# Patient Record
Sex: FEMALE | Race: WHITE | URBAN - METROPOLITAN AREA
[De-identification: names, ages, dates, MRNs, and addresses within clinical notes are randomized per-mention and may not be internally consistent; named-entity substitution may affect disease eponyms.]

---

## 2019-11-01 ENCOUNTER — OUTPATIENT (OUTPATIENT)
Dept: OUTPATIENT SERVICES | Facility: HOSPITAL | Age: 64
LOS: 1 days | Discharge: HOME | End: 2019-11-01
Payer: COMMERCIAL

## 2019-11-12 PROCEDURE — 90791 PSYCH DIAGNOSTIC EVALUATION: CPT

## 2019-11-20 ENCOUNTER — APPOINTMENT (OUTPATIENT)
Dept: SPINE | Facility: CLINIC | Age: 64
End: 2019-11-20

## 2019-12-05 ENCOUNTER — APPOINTMENT (OUTPATIENT)
Dept: SPINE | Facility: CLINIC | Age: 64
End: 2019-12-05

## 2020-12-24 ENCOUNTER — APPOINTMENT (OUTPATIENT)
Dept: ENDOCRINOLOGY | Facility: CLINIC | Age: 65
End: 2020-12-24

## 2022-01-09 ENCOUNTER — APPOINTMENT (OUTPATIENT)
Dept: PULMONOLOGY | Facility: CLINIC | Age: 67
End: 2022-01-09
Payer: MEDICARE

## 2022-01-09 ENCOUNTER — APPOINTMENT (OUTPATIENT)
Dept: PULMONOLOGY | Facility: CLINIC | Age: 67
End: 2022-01-09

## 2022-01-09 DIAGNOSIS — D32.9 BENIGN NEOPLASM OF MENINGES, UNSPECIFIED: ICD-10-CM

## 2022-01-09 DIAGNOSIS — R05.9 COUGH, UNSPECIFIED: ICD-10-CM

## 2022-01-09 DIAGNOSIS — D35.2 BENIGN NEOPLASM OF PITUITARY GLAND: ICD-10-CM

## 2022-01-09 DIAGNOSIS — I10 ESSENTIAL (PRIMARY) HYPERTENSION: ICD-10-CM

## 2022-01-09 DIAGNOSIS — U07.1 COVID-19: ICD-10-CM

## 2022-01-09 DIAGNOSIS — F32.A DEPRESSION, UNSPECIFIED: ICD-10-CM

## 2022-01-09 DIAGNOSIS — F41.9 ANXIETY DISORDER, UNSPECIFIED: ICD-10-CM

## 2022-01-09 PROCEDURE — 99203 OFFICE O/P NEW LOW 30 MIN: CPT | Mod: CS,95

## 2022-01-09 RX ORDER — ALBUTEROL SULFATE 90 UG/1
108 (90 BASE) INHALANT RESPIRATORY (INHALATION)
Qty: 1 | Refills: 1 | Status: ACTIVE | COMMUNITY
Start: 2022-01-09 | End: 1900-01-01

## 2022-01-09 RX ORDER — FLUTICASONE PROPIONATE AND SALMETEROL 250; 50 UG/1; UG/1
250-50 POWDER RESPIRATORY (INHALATION)
Qty: 1 | Refills: 0 | Status: ACTIVE | COMMUNITY
Start: 2022-01-09 | End: 1900-01-01

## 2022-01-09 RX ORDER — BENZONATATE 200 MG/1
200 CAPSULE ORAL 3 TIMES DAILY
Qty: 90 | Refills: 0 | Status: ACTIVE | COMMUNITY
Start: 2022-01-09 | End: 1900-01-01

## 2022-01-10 ENCOUNTER — APPOINTMENT (OUTPATIENT)
Dept: PULMONOLOGY | Facility: CLINIC | Age: 67
End: 2022-01-10
Payer: MEDICARE

## 2022-01-10 VITALS — WEIGHT: 250 LBS | HEIGHT: 68 IN | BODY MASS INDEX: 37.89 KG/M2

## 2022-01-10 VITALS
RESPIRATION RATE: 18 BRPM | HEART RATE: 101 BPM | BODY MASS INDEX: 37.89 KG/M2 | TEMPERATURE: 100.2 F | SYSTOLIC BLOOD PRESSURE: 118 MMHG | HEIGHT: 68 IN | DIASTOLIC BLOOD PRESSURE: 76 MMHG | WEIGHT: 250 LBS

## 2022-01-10 DIAGNOSIS — Z86.59 PERSONAL HISTORY OF OTHER MENTAL AND BEHAVIORAL DISORDERS: ICD-10-CM

## 2022-01-10 PROBLEM — D32.9 MENINGIOMA: Status: ACTIVE | Noted: 2022-01-10

## 2022-01-10 PROBLEM — F32.A DEPRESSION: Status: ACTIVE | Noted: 2022-01-10

## 2022-01-10 PROBLEM — F41.9 ANXIETY: Status: ACTIVE | Noted: 2022-01-10

## 2022-01-10 PROBLEM — D35.2 BENIGN TUMOR OF PITUITARY GLAND: Status: ACTIVE | Noted: 2022-01-10

## 2022-01-10 PROBLEM — I10 HYPERTENSION: Status: ACTIVE | Noted: 2022-01-10

## 2022-01-10 PROCEDURE — 99213 OFFICE O/P EST LOW 20 MIN: CPT | Mod: CS,95

## 2022-01-10 PROCEDURE — 99213 OFFICE O/P EST LOW 20 MIN: CPT | Mod: CS

## 2022-01-10 RX ORDER — DIVALPROEX SODIUM 125 MG/1
125 TABLET, DELAYED RELEASE ORAL
Refills: 0 | Status: ACTIVE | COMMUNITY
Start: 2022-01-10

## 2022-01-10 RX ORDER — LIOTHYRONINE SODIUM 5 UG/1
5 TABLET ORAL DAILY
Refills: 0 | Status: ACTIVE | COMMUNITY
Start: 2022-01-10

## 2022-01-10 RX ORDER — BUPROPION HYDROCHLORIDE 150 MG/1
150 TABLET, EXTENDED RELEASE ORAL DAILY
Refills: 0 | Status: ACTIVE | COMMUNITY
Start: 2022-01-10

## 2022-01-10 RX ORDER — ALPRAZOLAM 0.5 MG/1
0.5 TABLET, EXTENDED RELEASE ORAL DAILY
Refills: 0 | Status: ACTIVE | COMMUNITY
Start: 2022-01-10

## 2022-01-10 RX ORDER — METOPROLOL TARTRATE 25 MG/1
25 TABLET, FILM COATED ORAL DAILY
Refills: 0 | Status: ACTIVE | COMMUNITY
Start: 2022-01-10

## 2022-01-10 RX ORDER — PROTRIPTYLINE HYDROCHLORIDE 10 MG/1
10 TABLET, FILM COATED ORAL
Refills: 0 | Status: ACTIVE | COMMUNITY
Start: 2022-01-10

## 2022-01-10 RX ORDER — MIRTAZAPINE 7.5 MG/1
7.5 TABLET, FILM COATED ORAL
Refills: 0 | Status: ACTIVE | COMMUNITY
Start: 2022-01-10

## 2022-01-10 NOTE — DISCUSSION/SUMMARY
[FreeTextEntry1] : COVID Day 9. vaccinated x 3. s/p regen cov and zithro\par \par On multiple psych medications - remeron, protriptyline, wellbutrin, depakote, and xanax.\par \par intermittent altered ms/drowsiness, and perceived worsening of rep mvment. C/w drug effects.\par plans to lower doses per psych conversation today\par \par 02 sat is 94% - pt is morbidly obese, large pannus, likely atelectasis.. \par Will evaluate with labs now -- covid bundle and valproate level\par \par pt will start the wixela/alb prescribed over the weekend. i gave her a spacer\par \par start nasal flonase\par \par family advised that if her mental status worsens, however, they need to take her to the ED\par

## 2022-01-10 NOTE — PHYSICAL EXAM
[No Acute Distress] : no acute distress [Normal Rate/Rhythm] : normal rate/rhythm [Normal S1, S2] : normal s1, s2 [No Resp Distress] : no resp distress [Clear to Auscultation Bilaterally] : clear to auscultation bilaterally [Benign] : benign [No Clubbing] : no clubbing [No Edema] : no edema [Normal Color/ Pigmentation] : normal color/ pigmentation [No Focal Deficits] : no focal deficits [Oriented x3] : oriented x3 [TextBox_2] : at times drowsy, but easly arousable [TextBox_11] : nasal congestion [TextBox_44] : enlarged circ [TextBox_89] : obese, [TextBox_132] : h/l nahd tremor, minimal oral rep movements. [TextBox_140] : drowsy at time, but easily arousable and answers questions appriopriaotey

## 2022-01-10 NOTE — HISTORY OF PRESENT ILLNESS
[TextBox_4] : in person evaluation\par covid day 9\par alterations in mental status. increase in prior hand tremors and oral movements\par on multiple psych meds\par \par daughter and husand present as well..\par \par they spoke ot the psych on the way here - told them the meds were now too "toxic" due to the covid, and decrease the doses\par \par \par also new infromation:\par the day she tested positive, she received regen cov. and a zpakc - since complted

## 2022-01-10 NOTE — REVIEW OF SYSTEMS
[Fatigue] : fatigue [Nasal Congestion] : nasal congestion [Negative] : Hematologic [TextBox_122] : hpi [TextBox_137] : hpi

## 2022-01-10 NOTE — HISTORY OF PRESENT ILLNESS
[FreeTextEntry4] : , Xiang participated in visit, with patient's consent. [FreeTextEntry1] : 65 y/o female, Vaccinated for COVID x 2 + booster 9/30/21.\par 1st sx: 1/2/22 H/A, fatigue, runny nose, dry cough.\par PCR (+) on 1/2/22.\par 1/4/22:  Rec'd Remdesivir from local organization as they had no doses of Sotrivimab.\par PMH: depression ( on meds), brain meningioma (stable x 15 yrs) \par Ht / Wt (stated) 5'8", 250 lbs.\par HTN, denies other card or pulm history. No DM, non-smoker.\par \par Initially 02 Sat was 96-97% on RA.\par 1/8-1/9, experiencing worsening respiratory issues, up at night with cough.\par 02 Sat by pulse oximetry has been 92-93% today, yesterday range 89-92% yesterday. \par Called GP Thao Melendrez, referred to [facility].\par \par Telehealth:\par Appears tired, A & O x 4, speaks in full sentences w/out breathlessness.\par States she doesn't feel SOB or dizzy when 02 sat reads low.\par During visit, reads @ 93%, drops to 92% for a bit while walking, stabilizes @ 93%.\par Tried different fingers, same reading, on 's fingers, he is reading 98%.\par Her fingers are warm.\par \par IMP:\par COVID 19, Day 8, vaccinated x 3, immunocompetent, s/p regen-cov with asymptomatic drop in 02 Sat, stabilizes @ 93%.\par \par PLAN:\par Monitor and record 02 Sat 3x daily at rest and with activity.\par Call [facility] if less than 92% consistently and/or increasing SOB.\par To ER if less than 90% consistently and/or acute SOB.\par Start Advair BID & Albuterol, 3x/day x 2 days, then prn.\par Benzonatate for cough.\par Fluids.\par COVID labs in [facility] outpatient lab.\par Discussed course of COVID in vaccinated persons.\par TEB follow up 1/11/2022.

## 2022-01-11 ENCOUNTER — APPOINTMENT (OUTPATIENT)
Dept: PULMONOLOGY | Facility: CLINIC | Age: 67
End: 2022-01-11
Payer: MEDICARE

## 2022-01-11 ENCOUNTER — TRANSCRIPTION ENCOUNTER (OUTPATIENT)
Age: 67
End: 2022-01-11

## 2022-01-11 DIAGNOSIS — R41.0 DISORIENTATION, UNSPECIFIED: ICD-10-CM

## 2022-01-11 LAB
ALBUMIN SERPL ELPH-MCNC: 3.6 G/DL
ALP BLD-CCNC: 58 U/L
ALT SERPL-CCNC: 11 U/L
ANION GAP SERPL CALC-SCNC: 17 MMOL/L
AST SERPL-CCNC: 19 U/L
BASOPHILS # BLD AUTO: 0.02 K/UL
BASOPHILS NFR BLD AUTO: 0.4 %
BILIRUB SERPL-MCNC: 0.2 MG/DL
BUN SERPL-MCNC: 11 MG/DL
CALCIUM SERPL-MCNC: 8.9 MG/DL
CHLORIDE SERPL-SCNC: 98 MMOL/L
CO2 SERPL-SCNC: 23 MMOL/L
CREAT SERPL-MCNC: 0.83 MG/DL
CRP SERPL-MCNC: 113 MG/L
DEPRECATED D DIMER PPP IA-ACNC: 261 NG/ML DDU
EOSINOPHIL # BLD AUTO: 0.08 K/UL
EOSINOPHIL NFR BLD AUTO: 1.7 %
FERRITIN SERPL-MCNC: 279 NG/ML
GLUCOSE SERPL-MCNC: 83 MG/DL
HCT VFR BLD CALC: 37.7 %
HGB BLD-MCNC: 12.7 G/DL
IMM GRANULOCYTES NFR BLD AUTO: 0.4 %
LYMPHOCYTES # BLD AUTO: 0.82 K/UL
LYMPHOCYTES NFR BLD AUTO: 17.1 %
MAN DIFF?: NORMAL
MCHC RBC-ENTMCNC: 28.8 PG
MCHC RBC-ENTMCNC: 33.7 GM/DL
MCV RBC AUTO: 85.5 FL
MONOCYTES # BLD AUTO: 0.44 K/UL
MONOCYTES NFR BLD AUTO: 9.2 %
NEUTROPHILS # BLD AUTO: 3.41 K/UL
NEUTROPHILS NFR BLD AUTO: 71.2 %
PLATELET # BLD AUTO: 207 K/UL
POTASSIUM SERPL-SCNC: 4.3 MMOL/L
PROCALCITONIN SERPL-MCNC: 0.05 NG/ML
PROCALCITONIN SERPL-MCNC: 0.05 NG/ML
PROT SERPL-MCNC: 6.5 G/DL
RBC # BLD: 4.41 M/UL
RBC # FLD: 13.7 %
SODIUM SERPL-SCNC: 138 MMOL/L
VALPROATE SERPL-MCNC: 58 UG/ML
WBC # FLD AUTO: 4.79 K/UL

## 2022-01-11 PROCEDURE — 99214 OFFICE O/P EST MOD 30 MIN: CPT | Mod: CS,95

## 2022-01-11 NOTE — HISTORY OF PRESENT ILLNESS
[Home] : at home, [unfilled] , at the time of the visit. [Medical Office: ([facility])___] : at the medical office located in  [Spouse] : spouse [Family Member] : family member [Verbal consent obtained from patient] : the patient, [unfilled] [FreeTextEntry1] : Follow-up telehealth visit. Patient's daughter and  present as well.\par \par Daughter reports that patient continues to have episodes, not making sense. Daughter also reports at times she feels her mother's gait is off. Hands, she is asking if there is dehydration as her mother's dry. Daughter continues to feel there is a difference compared with the last time she saw her about a month ago.\par \par She started the lower regimen of psychiatric medication yesterday.\par \par \par On exam on video, she is awake and alert, fully oriented and appropriate.\par Her pulse ox is 95% to 96% on room air, heart rate 85-90.\par She still has a tremor of the arms as well as\par \par On her labs from yesterday, her CBC was normal, her CMP was normal. D-dimer was only 261. Ferritin was 279. Valproic level was low normal at 58. CRP was elevated 113\par \par I explained to them that they need to speak with the psychiatrist again today about these intermittent episodes of delirium. Additionally, a head CT may be warranted. I advised him the simplest way to get evaluated may be the emergency to this. I explained to them that she does need to close neurologic and psychiatric monitoring at this. There is nothing in her labs normal except for the elevated CRP, unsurprsing in the setting infection.\par

## 2022-01-12 ENCOUNTER — LABORATORY RESULT (OUTPATIENT)
Age: 67
End: 2022-01-12

## 2022-01-13 ENCOUNTER — APPOINTMENT (OUTPATIENT)
Dept: PULMONOLOGY | Facility: CLINIC | Age: 67
End: 2022-01-13
Payer: MEDICARE

## 2022-01-13 LAB
ALBUMIN SERPL ELPH-MCNC: 3.6 G/DL
ALP BLD-CCNC: 58 U/L
ALT SERPL-CCNC: 10 U/L
ANION GAP SERPL CALC-SCNC: 14 MMOL/L
APPEARANCE: CLEAR
AST SERPL-CCNC: 25 U/L
BASOPHILS # BLD AUTO: 0.03 K/UL
BASOPHILS NFR BLD AUTO: 0.7 %
BILIRUB SERPL-MCNC: 0.2 MG/DL
BILIRUBIN URINE: NEGATIVE
BLOOD URINE: NEGATIVE
BUN SERPL-MCNC: 8 MG/DL
CALCIUM SERPL-MCNC: 8.9 MG/DL
CHLORIDE SERPL-SCNC: 100 MMOL/L
CO2 SERPL-SCNC: 27 MMOL/L
COLOR: NORMAL
CREAT SERPL-MCNC: 0.82 MG/DL
CRP SERPL-MCNC: 108 MG/L
EOSINOPHIL # BLD AUTO: 0.2 K/UL
EOSINOPHIL NFR BLD AUTO: 4.4 %
FERRITIN SERPL-MCNC: 335 NG/ML
GLUCOSE QUALITATIVE U: NEGATIVE
GLUCOSE SERPL-MCNC: 82 MG/DL
HCT VFR BLD CALC: 39.9 %
HGB BLD-MCNC: 13.1 G/DL
IMM GRANULOCYTES NFR BLD AUTO: 0.7 %
KETONES URINE: NORMAL
LEUKOCYTE ESTERASE URINE: NEGATIVE
LYMPHOCYTES # BLD AUTO: 1.1 K/UL
LYMPHOCYTES NFR BLD AUTO: 24.3 %
MAN DIFF?: NORMAL
MCHC RBC-ENTMCNC: 28.9 PG
MCHC RBC-ENTMCNC: 32.8 GM/DL
MCV RBC AUTO: 87.9 FL
MONOCYTES # BLD AUTO: 0.59 K/UL
MONOCYTES NFR BLD AUTO: 13 %
NEUTROPHILS # BLD AUTO: 2.58 K/UL
NEUTROPHILS NFR BLD AUTO: 56.9 %
NITRITE URINE: NEGATIVE
PH URINE: 7
PLATELET # BLD AUTO: 249 K/UL
POTASSIUM SERPL-SCNC: 4.4 MMOL/L
PROCALCITONIN SERPL-MCNC: 0.05 NG/ML
PROT SERPL-MCNC: 6.8 G/DL
PROTEIN URINE: NEGATIVE
RBC # BLD: 4.54 M/UL
RBC # FLD: 13.7 %
SODIUM SERPL-SCNC: 141 MMOL/L
SPECIFIC GRAVITY URINE: 1
UROBILINOGEN URINE: NORMAL
WBC # FLD AUTO: 4.53 K/UL

## 2022-01-13 PROCEDURE — 99214 OFFICE O/P EST MOD 30 MIN: CPT | Mod: CS,95

## 2022-01-13 NOTE — HISTORY OF PRESENT ILLNESS
[Home] : at home, [unfilled] , at the time of the visit. [Medical Office: ([facility])___] : at the medical office located in  [Spouse] : spouse [Family Member] : family member [Verbal consent obtained from patient] : the patient, [unfilled] [FreeTextEntry1] : Follow-up telehealth visit, patient's  and daughter present as well\par \par Yesterday, her daughter had called me.  Said that she had gotten an appointment to see the neurologist yesterday, and also home care physician to come to the house.  Physician from home care was requesting a UA and a chest x-ray as well.  They reported that patient had had 1 fever to 101 the night before.\par \par Yesterday patient had a chest x-ray and CT head done at the [facility].\par Then had an evaluation with Dr. Silvino Molina, whom I spoke with after the visit as well.  He felt this was likely related to overmedication as well as COVID related encephalitis typical in patients especially with underlying disease.  He also ordered an MRI for further evaluation.\par \par Today, she is feeling much better.  She has had no further fever.  She is no longer confused, mental status is improved.  She has no shortness of breath.  Her only remaining complaint at this point is coughing at night while lying down.  Cough greatly improves when she sits up and walks as well.  She is using the Wixela.  She has Flonase but is not using it.\par \par \par On video today, she looks so much better.  Extraparametal movements are better.  There is much decreased oral movements and tremor.  She is awake and alert, much more conversive, oriented fully and appropriate in affect today.\par \par Her labs that were done yesterday continued to show no leukocytosis, and normal procalcitonin, normal CMP, CRP is now down to 182 from 113.\par \par The chest x-ray reports from his [place] was read as mildly increased interstitial markings in the lower lung zones, inflammatory/atypical infectious process with the heart size that is borderline enlarged.  I have reviewed the images myself and I am not impressed.  Especially given patient's body habitus and overlying soft tissue.\par I have not received the report from the CT of the head, but apparently the neurologist was able to see that.\par \par I am adding on a proBNP to today's labs, just given the possible increased interstitial markings on the x-ray together with the cardiomegaly.\par \par Otherwise, post COVID, doing well.  Much better since the decrease in her multiple psychiatric medications.\par Flonase twice daily for the upper airway cough post viral infection.  Continue to Wixela, 2 to 3 weeks until better.  Albuterol only as needed, I do not think she needs it.\par If proBNP comes back elevated, will refer back to her cardiologist and internist.

## 2024-02-26 ENCOUNTER — APPOINTMENT (OUTPATIENT)
Dept: ORTHOPEDIC SURGERY | Facility: CLINIC | Age: 69
End: 2024-02-26
Payer: MEDICARE

## 2024-02-26 DIAGNOSIS — Z00.00 ENCOUNTER FOR GENERAL ADULT MEDICAL EXAMINATION W/OUT ABNORMAL FINDINGS: ICD-10-CM

## 2024-02-26 DIAGNOSIS — Z78.9 OTHER SPECIFIED HEALTH STATUS: ICD-10-CM

## 2024-02-26 PROCEDURE — 99203 OFFICE O/P NEW LOW 30 MIN: CPT

## 2024-02-26 PROCEDURE — 73110 X-RAY EXAM OF WRIST: CPT | Mod: LT

## 2024-02-26 NOTE — ASSESSMENT
[FreeTextEntry1] : The patient was advised of the diagnosis. The natural history of the pathology was explained in full to the patient in layman's terms. All questions were answered. The risks and benefits of surgical and non-surgical treatment alternatives were explained in full to the patient.  NSAIDs recommended.  Patient warned of risk of NSAID medication to stomach and GI tract, risk of increase blood pressure, cardiac risk, and risk of fluid retention.  The patient should clear taking medication with internist/PMD if any problem with heart, blood pressure, or GI system exists.  Recommended Voltaren

## 2024-02-26 NOTE — DATA REVIEWED
[Outside X-rays] : outside x-rays [Cervical Spine] : cervical spine [I independently reviewed and interpreted images and report] : I independently reviewed and interpreted images and report [FreeTextEntry1] : C5,6 DDD

## 2024-02-26 NOTE — HISTORY OF PRESENT ILLNESS
[5] : 5 [3] : 3 [Dull/Aching] : dull/aching [Localized] : localized [Radiating] : radiating [Tingling] : tingling [Intermittent] : intermittent [Nothing helps with pain getting better] : Nothing helps with pain getting better [Retired] : Work status: retired [de-identified] : 02/26/2024 :GARIMA WEBBER , a 69 year old female, presents today for left hand and neck pain, was seen by orthopedic in [place] had xray and MRI she co pain in the upper left shoulder blade radiating to the hand. therapy helped.  after two months, pain and tingling improved c/o pain in the wrist ad forearm   RHD  [] : Patient is currently playing sports: no

## 2024-02-26 NOTE — IMAGING
[de-identified] : left elbow: no swelling/ecchymosis mild pain with resisted wrist extension and supination farom nvid  left wrist: ttp over 1st dorsal compartment negative Finklestein's farom nvid

## 2024-03-14 ENCOUNTER — APPOINTMENT (OUTPATIENT)
Dept: ORTHOPEDIC SURGERY | Facility: CLINIC | Age: 69
End: 2024-03-14
Payer: MEDICARE

## 2024-03-14 DIAGNOSIS — M75.22 BICIPITAL TENDINITIS, LEFT SHOULDER: ICD-10-CM

## 2024-03-14 DIAGNOSIS — M65.4 RADIAL STYLOID TENOSYNOVITIS [DE QUERVAIN]: ICD-10-CM

## 2024-03-14 PROCEDURE — 20550 NJX 1 TENDON SHEATH/LIGAMENT: CPT | Mod: LT

## 2024-03-14 PROCEDURE — 99213 OFFICE O/P EST LOW 20 MIN: CPT | Mod: 25

## 2024-03-14 NOTE — IMAGING
[de-identified] : left elbow: no swelling/ecchymosis ttp over distal biceps tendon (intact) / worse with supination. mild pain with resisted wrist extension and supination farom nvid  left wrist: minimal ttp over 1st dorsal compartment negative Finklestein's farom nvid

## 2024-03-14 NOTE — ASSESSMENT
[FreeTextEntry1] : The patient was advised of the diagnosis. The natural history of the pathology was explained in full to the patient in layman's terms. All questions were answered. The risks and benefits of surgical and non-surgical treatment alternatives were explained in full to the patient.  continue prn NSAIDs recommended.  Patient warned of risk of NSAID medication to stomach and GI tract, risk of increase blood pressure, cardiac risk, and risk of fluid retention.  The patient should clear taking medication with internist/PMD if any problem with heart, blood pressure, or GI system exists.  OT rx provided for left distal biceps tendinitis.  Left 1st dorsal compartment CSI #1 provided today.

## 2024-03-14 NOTE — HISTORY OF PRESENT ILLNESS
[5] : 5 [3] : 3 [Dull/Aching] : dull/aching [Localized] : localized [Radiating] : radiating [Tingling] : tingling [Intermittent] : intermittent [Nothing helps with pain getting better] : Nothing helps with pain getting better [Retired] : Work status: retired [de-identified] : 03/14/24: follow up on left hand DeQuervains Tendinitis. She has been using Voltaren gel which does not help.  02/26/2024 :GARIMA WEBBER , a 69 year old female, presents today for left hand and neck pain, was seen by orthopedic in [place] had xray and MRI she co pain in the upper left shoulder blade radiating to the hand. therapy helped.  after two months, pain and tingling improved c/o pain in the wrist ad forearm    Hx of previous left upper extremity lipoma excision 8/2023. (pain began 6 mos s/p surgery)  RHD  [] : Patient is currently injured and not playing sports: no

## 2024-05-02 ENCOUNTER — APPOINTMENT (OUTPATIENT)
Dept: ORTHOPEDIC SURGERY | Facility: CLINIC | Age: 69
End: 2024-05-02